# Patient Record
Sex: MALE | Race: ASIAN | Employment: FULL TIME | URBAN - METROPOLITAN AREA
[De-identification: names, ages, dates, MRNs, and addresses within clinical notes are randomized per-mention and may not be internally consistent; named-entity substitution may affect disease eponyms.]

---

## 2017-05-06 ENCOUNTER — OFFICE VISIT (OUTPATIENT)
Dept: FAMILY MEDICINE CLINIC | Age: 37
End: 2017-05-06

## 2017-05-06 VITALS
HEART RATE: 63 BPM | RESPIRATION RATE: 14 BRPM | WEIGHT: 214.8 LBS | OXYGEN SATURATION: 97 % | HEIGHT: 68 IN | TEMPERATURE: 97 F | DIASTOLIC BLOOD PRESSURE: 81 MMHG | SYSTOLIC BLOOD PRESSURE: 118 MMHG | BODY MASS INDEX: 32.55 KG/M2

## 2017-05-06 DIAGNOSIS — R73.02 GLUCOSE INTOLERANCE (IMPAIRED GLUCOSE TOLERANCE): ICD-10-CM

## 2017-05-06 DIAGNOSIS — K11.8 SALIVARY GLAND OBSTRUCTION: ICD-10-CM

## 2017-05-06 DIAGNOSIS — R22.0 JAW SWELLING: Primary | ICD-10-CM

## 2017-05-06 DIAGNOSIS — R20.2 TINGLING IN EXTREMITIES: ICD-10-CM

## 2017-05-06 NOTE — PROGRESS NOTES
Chief Complaint   Patient presents with    Jaw Swelling     left side of jaw swelling x 1 days c/o pain when opening mouth 3/10    Tingling     c/o tingling and burning sensation in both feet x 1 week     Pt states that he is fasting  Med list reviewed  \"REVIEWED RECORD IN PREPARATION FOR VISIT AND HAVE OBTAINED THE NECESSARY DOCUMENTATION\"

## 2017-05-06 NOTE — MR AVS SNAPSHOT
Visit Information Date & Time Provider Department Dept. Phone Encounter #  
 5/6/2017 11:00 AM Tavares Lopez, Shelly [facility] [address] [phone] [id number] Upcoming Health Maintenance Date Due INFLUENZA AGE 9 TO ADULT 8/1/2017 DTaP/Tdap/Td series (2 - Td) 10/17/2023 Allergies as of 5/6/2017  Review Complete On: 5/6/2017 By: Tavares Lopez NP No Known Allergies Current Immunizations  Never Reviewed Name Date Influenza Vaccine PF 10/17/2013  7:57 PM  
 Tdap 10/17/2013  7:54 PM  
  
 Not reviewed this visit You Were Diagnosed With   
  
 Codes Comments Glucose intolerance (impaired glucose tolerance)    -  Primary ICD-10-CM: R73.02 
ICD-9-CM: 790.22 Jaw swelling     ICD-10-CM: R22.0 ICD-9-CM: 784.2 Salivary gland obstruction     ICD-10-CM: K11.8 ICD-9-CM: 527.8 Vitals BP Pulse Temp Resp Height(growth percentile) Weight(growth percentile) 118/81 (BP 1 Location: Right arm, BP Patient Position: Sitting) 63 97 °F (36.1 °C) (Oral) 14 5' 8\" (1.727 m) 214 lb 12.8 oz (97.4 kg) SpO2 BMI Smoking Status 97% 32.66 kg/m2 Never Smoker Vitals History BMI and BSA Data Body Mass Index Body Surface Area  
 32.66 kg/m 2 2.16 m 2 Preferred Pharmacy Pharmacy Name Phone [facility][id number] juan [address] [phone] Your Updated Medication List  
  
   
This list is accurate as of: 5/6/17 11:43 AM.  Always use your most recent med list.  
  
  
  
  
 diclofenac EC 75 mg EC tablet Commonly known as:  VOLTAREN Take 1 Tab by mouth two (2) times a day. ergocalciferol 50,000 unit capsule Commonly known as:  ERGOCALCIFEROL  
TAKE 1 CAP BY MOUTH EVERY SEVEN (7) DAYS  
  
 methylPREDNISolone 4 mg tablet Commonly known as:  Burns Nightingale Take 1 Tab by mouth Specific Days and Specific Times. Per dose jared instructions We Performed the Following HEMOGLOBIN A1C WITH EAG [41369 CPT(R)] LIPID PANEL [59442 CPT(R)] METABOLIC PANEL, COMPREHENSIVE [27366 CPT(R)] Introducing [facility] [place] & [facility]! Dear Jayson Duane: Thank you for requesting a [email or web address] account. Our records indicate that you already have an active [email or web address] account. You can access your account anytime at [email or web address] Did you know that you can access your hospital and ER discharge instructions at any time in [email or web address]? You can also review all of your test results from your hospital stay or ER visit. Additional Information If you have questions, please visit the Frequently Asked Questions section of the [email or web address] website at [email or web address] Remember, [email or web address] is NOT to be used for urgent needs. For medical emergencies, dial 911. Now available from your iPhone and Android! Please provide this summary of care documentation to your next provider. Your primary care clinician is listed as Portia Alba. If you have any questions after today's visit, please call [phone].

## 2017-05-06 NOTE — PROGRESS NOTES
HISTORY OF PRESENT ILLNESS  Nellie Gan is a 39 y.o. male. HPI: Patient reports he woke up this morning with swelling of left jaw. Denies ear infection, jaw pain, nasal congestion or toothache . He also C/O tinging in both feet, he is prediabetic and is wondering if his blood sugar has gone up, requesting lab work,. Past Surgical History:   Procedure Laterality Date    HX HEENT  2013,    gum surgery     No Known Allergies    Current Outpatient Prescriptions:     ergocalciferol (ERGOCALCIFEROL) 50,000 unit capsule, TAKE 1 CAP BY MOUTH EVERY SEVEN (7) DAYS, Disp: 12 Cap, Rfl: 1    diclofenac EC (VOLTAREN) 75 mg EC tablet, Take 1 Tab by mouth two (2) times a day., Disp: 60 Tab, Rfl: 0    methylPREDNISolone (MEDROL DOSEPACK) 4 mg tablet, Take 1 Tab by mouth Specific Days and Specific Times. Per dose jared instructions, Disp: 21 Tab, Rfl: 0    Review of Systems   Constitutional: Negative. Respiratory: Negative. Cardiovascular: Negative. Gastrointestinal: Negative. Blood pressure 118/81, pulse 63, temperature 97 °F (36.1 °C), temperature source Oral, resp. rate 14, height 5' 8\" (1.727 m), weight 214 lb 12.8 oz (97.4 kg), SpO2 97 %. Physical Exam   HENT:   Right Ear: External ear normal.   Left Ear: External ear normal.   Nose: Nose normal.   Mouth/Throat: Oropharynx is clear and moist.   Slightly swelling of left jaw, no pain, or redness or heat   Neck: Neck supple. Cardiovascular: Normal rate and regular rhythm. No murmur heard. Pulmonary/Chest: Effort normal and breath sounds normal.   Abdominal: Soft. Bowel sounds are normal.   Nursing note and vitals reviewed. ASSESSMENT and PLAN    ICD-10-CM ICD-9-CM    1. Jaw swelling R22.0 784.2    2. Glucose intolerance (impaired glucose tolerance) R73.02 790.22 HEMOGLOBIN A1C WITH EAG      METABOLIC PANEL, COMPREHENSIVE      LIPID PANEL   3. 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  Tingling in extremities R20.2 782.0    Advised to apply warm compress to your jaw,and massage jaw   Gargle with lemon water  Call if not better  Pt was given an after visit summary which includes diagnosis, current medicines and vital and voiced understanding of treatment plan

## 2017-05-08 LAB
ALBUMIN SERPL-MCNC: 4.3 G/DL (ref 3.5–5.5)
ALBUMIN/GLOB SERPL: 1.3 {RATIO} (ref 1.2–2.2)
ALP SERPL-CCNC: 75 IU/L (ref 39–117)
ALT SERPL-CCNC: 12 IU/L (ref 0–44)
AST SERPL-CCNC: 14 IU/L (ref 0–40)
BILIRUB SERPL-MCNC: 0.4 MG/DL (ref 0–1.2)
BUN SERPL-MCNC: 12 MG/DL (ref 6–20)
BUN/CREAT SERPL: 12 (ref 9–20)
CALCIUM SERPL-MCNC: 9.7 MG/DL (ref 8.7–10.2)
CHLORIDE SERPL-SCNC: 101 MMOL/L (ref 96–106)
CHOLEST SERPL-MCNC: 143 MG/DL (ref 100–199)
CO2 SERPL-SCNC: 22 MMOL/L (ref 18–29)
CREAT SERPL-MCNC: 1.03 MG/DL (ref 0.76–1.27)
EST. AVERAGE GLUCOSE BLD GHB EST-MCNC: 120 MG/DL
GLOBULIN SER CALC-MCNC: 3.2 G/DL (ref 1.5–4.5)
GLUCOSE SERPL-MCNC: 80 MG/DL (ref 65–99)
HBA1C MFR BLD: 5.8 % (ref 4.8–5.6)
HDLC SERPL-MCNC: 44 MG/DL
INTERPRETATION, 910389: NORMAL
LDLC SERPL CALC-MCNC: 82 MG/DL (ref 0–99)
POTASSIUM SERPL-SCNC: 5.3 MMOL/L (ref 3.5–5.2)
PROT SERPL-MCNC: 7.5 G/DL (ref 6–8.5)
SODIUM SERPL-SCNC: 141 MMOL/L (ref 134–144)
TRIGL SERPL-MCNC: 86 MG/DL (ref 0–149)
VLDLC SERPL CALC-MCNC: 17 MG/DL (ref 5–40)

## 2017-05-17 ENCOUNTER — OFFICE VISIT (OUTPATIENT)
Dept: FAMILY MEDICINE CLINIC | Age: 37
End: 2017-05-17

## 2017-05-17 VITALS
HEART RATE: 66 BPM | TEMPERATURE: 98.1 F | SYSTOLIC BLOOD PRESSURE: 113 MMHG | RESPIRATION RATE: 14 BRPM | WEIGHT: 215.6 LBS | HEIGHT: 68 IN | DIASTOLIC BLOOD PRESSURE: 81 MMHG | BODY MASS INDEX: 32.67 KG/M2 | OXYGEN SATURATION: 95 %

## 2017-05-17 DIAGNOSIS — M79.2 PERIPHERAL NEUROPATHIC PAIN: Primary | ICD-10-CM

## 2017-05-17 RX ORDER — GABAPENTIN 100 MG/1
100 CAPSULE ORAL
Qty: 30 CAP | Refills: 1 | Status: SHIPPED | OUTPATIENT
Start: 2017-05-17

## 2017-05-17 RX ORDER — CYANOCOBALAMIN 1000 UG/ML
1000 INJECTION, SOLUTION INTRAMUSCULAR; SUBCUTANEOUS ONCE
Qty: 1 ML | Refills: 0
Start: 2017-05-17 | End: 2017-05-17

## 2017-05-17 NOTE — MR AVS SNAPSHOT
Visit Information Date & Time Provider Department Dept. Phone Encounter #  
 5/17/2017  7:15 PM Chidi Herbert  [facility] [phone] [id number] Follow-up Instructions Return in about 2 weeks (around 5/31/2017) for nerve pain. Your Appointments 5/22/2017 10:30 AM  
PHYSICAL PRE OP with Allie Barron MD  
[facility] Internal Medicine [address]) Appt Note: Np leaving [facility] practice because his dr left [facility] [address]  
[address] [place] [address] [address] [place] [address] Upcoming Health Maintenance Date Due INFLUENZA AGE 9 TO ADULT 8/1/2017 DTaP/Tdap/Td series (2 - Td) 10/17/2023 Allergies as of 5/17/2017  Review Complete On: 5/6/2017 By: Patito Shipman NP No Known Allergies Current Immunizations  Never Reviewed Name Date Influenza Vaccine PF 10/17/2013  7:57 PM  
 Tdap 10/17/2013  7:54 PM  
  
 Not reviewed this visit You Were Diagnosed With   
  
 Codes Comments Peripheral neuropathic pain    -  Primary ICD-10-CM: G62.9 ICD-9-CM: 356.9 Vitals BP Pulse Temp Resp Height(growth percentile) Weight(growth percentile) 113/81 (BP 1 Location: Left arm, BP Patient Position: Sitting) 66 98.1 °F (36.7 °C) (Oral) 14 5' 8\" (1.727 m) 215 lb 9.6 oz (97.8 kg) SpO2 BMI Smoking Status 95% 32.78 kg/m2 Never Smoker Vitals History BMI and BSA Data Body Mass Index Body Surface Area 32.78 kg/m 2 2.17 m 2 Preferred Pharmacy Pharmacy Name Phone [id number] [place] , [address] [phone] Your Updated Medication List  
  
   
This list is accurate as of: 5/17/17  8:03 PM.  Always use your most recent med list.  
  
  
  
  
 cyanocobalamin 1,000 mcg/mL injection Commonly known as:  VITAMIN B-12  
 1 mL by IntraMUSCular route once for 1 dose. diclofenac EC 75 mg EC tablet Commonly known as:  VOLTAREN Take 1 Tab by mouth two (2) times a day. ergocalciferol 50,000 unit capsule Commonly known as:  ERGOCALCIFEROL  
TAKE 1 CAP BY MOUTH EVERY SEVEN (7) DAYS  
  
 gabapentin 100 mg capsule Commonly known as:  NEURONTIN Take 1 Cap by mouth nightly. methylPREDNISolone 4 mg tablet Commonly known as:  Kalin Hands Take 1 Tab by mouth Specific Days and Specific Times. Per dose jared instructions Prescriptions Sent to Pharmacy Refills  
 gabapentin (NEURONTIN) 100 mg capsule 1 Sig: Take 1 Cap by mouth nightly. Class: Normal  
 [facility], [address] [place] [address] #: [phone] Route: Oral  
  
We Performed the Following THER/PROPH/DIAG INJECTION, SUBCUT/IM [id number] CPT(R)] VITAMIN B12 INJECTION [ [facility]] Follow-up Instructions Return in about 2 weeks (around 5/31/2017) for nerve pain. Patient Instructions Pernicious Anemia: Care Instructions Your Care Instructions Pernicious anemia means that you do not have enough red blood cells. It happens when your body can't absorb vitamin B12 from food. Your body needs vitamin B12 to make red blood cells. Red blood cells carry oxygen around your body. Vitamin B12 also helps your nerves work well. Your doctor can treat this problem with vitamin B12 shots. You may also take vitamin B12 by pill or nasal spray. With treatment, most anemia gets better in a few days. But if you have severe anemia, you may need a blood transfusion to give you red blood cells as quickly as possible. Follow-up care is a key part of your treatment and safety. Be sure to make and go to all appointments, and call your doctor if you are having problems. It's also a good idea to know your test results and keep a list of the medicines you take. How can you care for yourself at home? · Be safe with medicines. Take your medicines exactly as prescribed. Call your doctor if you think you are having a problem with your medicine. · Follow your doctor's instructions about vitamin B12 shots, vitamin B12 pills, or a vitamin B12 nasal spray. · Eat a varied diet. Include foods with a lot of vitamin B12, such as eggs, milk, and meat. · Do not drink alcohol while you are being treated. Alcohol can prevent the body from absorbing vitamin B12. · Eat foods that have folate (also called folic acid). This is another type of B vitamin. Foods with folate include leafy green vegetables, citrus fruits, and fortified cereals. When should you call for help? Call 911 anytime you think you may need emergency care. For example, call if: 
· You passed out (lost consciousness). · You vomit blood or what looks like coffee grounds. · You pass maroon or very bloody stools. · You have severe pain in your belly. Call your doctor now or seek immediate medical care if: 
· You have new belly pain. · You are dizzy or lightheaded, or you feel like you may faint. · Your stools are black and look like tar, or they have streaks of blood. · You have numbness or tingling in your hands or feet. · You can't think clearly. Watch closely for changes in your health, and be sure to contact your doctor if: 
· You have trouble with balance and coordination. · Your fatigue and weakness continue or get worse. · You have any new symptoms, such as vomiting. Where can you learn more? Go to http://cecilia-prasanna.info/. Enter R544 in the search box to learn more about \"Pernicious Anemia: Care Instructions. \" Current as of: October 13, 2016 Content Version: 11.2 © 9371-0606 [employer]. Care instructions adapted under license by [employer] (which disclaims liability or warranty for this information).  If you have questions about a medical condition or this instruction, always ask your healthcare professional. [address] any warranty or liability for your use of this information. Introducing [facility] & HEALTH SERVICES! Dear Edin Lucio: Thank you for requesting a WebXiom account. Our records indicate that you already have an active WebXiom account. You can access your account anytime at [email or web address] Did you know that you can access your hospital and ER discharge instructions at any time in WebXiom? You can also review all of your test results from your hospital stay or ER visit. Additional Information If you have questions, please visit the Frequently Asked Questions section of the WebXiom website at [email or web address] Remember, WebXiom is NOT to be used for urgent needs. For medical emergencies, dial 911. Now available from your iPhone and Android! Please provide this summary of care documentation to your next provider. Your primary care clinician is listed as Karla Leblanc. If you have any questions after today's visit, please call [phone].

## 2017-05-17 NOTE — PROGRESS NOTES
Chief Complaint   Patient presents with    Tingling     c/o tingling and pain in both feet that is getting worse 4/10     Leg Pain     pain in both calf x 2 week      Pt also c/o sweating in the palms of hands  Med list reviewed  \"REVIEWED RECORD IN PREPARATION FOR VISIT AND HAVE OBTAINED THE NECESSARY DOCUMENTATION\"

## 2017-05-17 NOTE — PROGRESS NOTES
Katie Mederos is a 39 y.o. male who was seen in clinic today (5/18/2017). Subjective:  Neuropathic Pain:  Patient has neuropathy, primarily affecting the hands and feet. Symptoms onset: 2 weeks ago. Reports increasing pain in feet described as burning and tingling. Symptoms worse at night. Recent diabetes testing negative. B12 in 8/2016 was low. No current medications for pain. Reports some associated fatigue. Prior to Admission medications    Medication Sig Start Date End Date Taking? Authorizing Provider   cyanocobalamin (VITAMIN B-12) 1,000 mcg/mL injection 1 mL by IntraMUSCular route once for 1 dose. 5/17/17 5/17/17 Yes Christi Mayberry NP   gabapentin (NEURONTIN) 100 mg capsule Take 1 Cap by mouth nightly. 5/17/17  Yes Christi Mayberry NP   ergocalciferol (ERGOCALCIFEROL) 50,000 unit capsule TAKE 1 CAP BY MOUTH EVERY SEVEN (7) DAYS 11/28/16  Yes Christi Mayberry NP   diclofenac EC (VOLTAREN) 75 mg EC tablet Take 1 Tab by mouth two (2) times a day. 8/16/16   Bill Domínguez MD   methylPREDNISolone (MEDROL DOSEPACK) 4 mg tablet Take 1 Tab by mouth Specific Days and Specific Times. Per dose jared instructions 8/16/16   Bill Domínguez MD          No Known Allergies        ROS  See HPI    Objective:   Physical Exam   Constitutional: He is oriented to person, place, and time. He appears well-developed and well-nourished. Neck: Normal range of motion. Neck supple. No JVD present. Carotid bruit is not present. No thyromegaly present. Cardiovascular: Normal rate, regular rhythm and intact distal pulses. Exam reveals no gallop and no friction rub. No murmur heard. Pulmonary/Chest: Effort normal and breath sounds normal. No respiratory distress. Musculoskeletal: He exhibits no edema. Right hand: Normal sensation noted. Normal strength noted. Left hand: Normal sensation noted. Normal strength noted. Lymphadenopathy:     He has no cervical adenopathy.    Neurological: He is alert and oriented to person, place, and time. He has normal strength. No cranial nerve deficit or sensory deficit. Gait normal.   Psychiatric: He has a normal mood and affect. His behavior is normal.   Nursing note and vitals reviewed. Visit Vitals    /81 (BP 1 Location: Left arm, BP Patient Position: Sitting)    Pulse 66    Temp 98.1 °F (36.7 °C) (Oral)    Resp 14    Ht 5' 8\" (1.727 m)    Wt 215 lb 9.6 oz (97.8 kg)    SpO2 95%    BMI 32.78 kg/m2       Assessment & Plan:  Eleanor Maya was seen today for tingling and leg pain. Diagnoses and all orders for this visit:    Peripheral neuropathic pain  Possible B12 deficiency given previous lab results. Will given B12 injection today and re-evaluate effectiveness. Additional lab testing and neurology consult as needed. Gabapentin QHS for pain. -     VITAMIN B12 INJECTION ()  -     cyanocobalamin (VITAMIN B-12) 1,000 mcg/mL injection; 1 mL by IntraMUSCular route once for 1 dose.  -     gabapentin (NEURONTIN) 100 mg capsule; Take 1 Cap by mouth nightly. I have discussed the diagnosis with the patient and the intended plan as seen in the above orders. The patient has received an after-visit summary along with patient information handout. I have discussed medication side effects and warnings with the patient as well. Follow-up Disposition:  Return in about 2 weeks (around 5/31/2017) for nerve pain.         Ana Aguiar NP

## 2017-05-18 NOTE — PATIENT INSTRUCTIONS
Pernicious Anemia: Care Instructions  Your Care Instructions    Pernicious anemia means that you do not have enough red blood cells. It happens when your body can't absorb vitamin B12 from food. Your body needs vitamin B12 to make red blood cells. Red blood cells carry oxygen around your body. Vitamin B12 also helps your nerves work well. Your doctor can treat this problem with vitamin B12 shots. You may also take vitamin B12 by pill or nasal spray. With treatment, most anemia gets better in a few days. But if you have severe anemia, you may need a blood transfusion to give you red blood cells as quickly as possible. Follow-up care is a key part of your treatment and safety. Be sure to make and go to all appointments, and call your doctor if you are having problems. It's also a good idea to know your test results and keep a list of the medicines you take. How can you care for yourself at home? · Be safe with medicines. Take your medicines exactly as prescribed. Call your doctor if you think you are having a problem with your medicine. · Follow your doctor's instructions about vitamin B12 shots, vitamin B12 pills, or a vitamin B12 nasal spray. · Eat a varied diet. Include foods with a lot of vitamin B12, such as eggs, milk, and meat. · Do not drink alcohol while you are being treated. Alcohol can prevent the body from absorbing vitamin B12. · Eat foods that have folate (also called folic acid). This is another type of B vitamin. Foods with folate include leafy green vegetables, citrus fruits, and fortified cereals. When should you call for help? Call 911 anytime you think you may need emergency care. For example, call if:  · You passed out (lost consciousness). · You vomit blood or what looks like coffee grounds. · You pass maroon or very bloody stools. · You have severe pain in your belly. Call your doctor now or seek immediate medical care if:  · You have new belly pain.   · You are dizzy or lightheaded, or you feel like you may faint. · Your stools are black and look like tar, or they have streaks of blood. · You have numbness or tingling in your hands or feet. · You can't think clearly. Watch closely for changes in your health, and be sure to contact your doctor if:  · You have trouble with balance and coordination. · Your fatigue and weakness continue or get worse. · You have any new symptoms, such as vomiting. Where can you learn more? Go to http://cecilia-prasanna.info/. Enter V529 in the search box to learn more about \"Pernicious Anemia: Care Instructions. \"  Current as of: October 13, 2016  Content Version: 11.2  © 7532-1926 [employer]. Care instructions adapted under license by [employer] (which disclaims liability or warranty for this information). If you have questions about a medical condition or this instruction, always ask your healthcare professional. [place] [address] any warranty or liability for your use of this information.

## 2017-05-22 ENCOUNTER — OFFICE VISIT (OUTPATIENT)
Dept: INTERNAL MEDICINE CLINIC | Age: 37
End: 2017-05-22

## 2017-05-22 VITALS
TEMPERATURE: 97.8 F | BODY MASS INDEX: 32.83 KG/M2 | RESPIRATION RATE: 16 BRPM | OXYGEN SATURATION: 98 % | DIASTOLIC BLOOD PRESSURE: 74 MMHG | HEIGHT: 68 IN | HEART RATE: 69 BPM | SYSTOLIC BLOOD PRESSURE: 98 MMHG | WEIGHT: 216.6 LBS

## 2017-05-22 DIAGNOSIS — R73.02 IGT (IMPAIRED GLUCOSE TOLERANCE): ICD-10-CM

## 2017-05-22 DIAGNOSIS — R73.03 PRE-DIABETES: ICD-10-CM

## 2017-05-22 DIAGNOSIS — E55.9 VITAMIN D DEFICIENCY: ICD-10-CM

## 2017-05-22 DIAGNOSIS — M19.90 ARTHRITIS: ICD-10-CM

## 2017-05-22 DIAGNOSIS — R93.89 ABNORMAL CXR: ICD-10-CM

## 2017-05-22 DIAGNOSIS — E53.8 B12 DEFICIENCY DUE TO DIET: Primary | ICD-10-CM

## 2017-05-22 DIAGNOSIS — R20.2 PARESTHESIA OF BOTH FEET: ICD-10-CM

## 2017-05-22 RX ORDER — LANOLIN ALCOHOL/MO/W.PET/CERES
500 CREAM (GRAM) TOPICAL DAILY
Qty: 90 TAB | Refills: 0 | Status: SHIPPED | OUTPATIENT
Start: 2017-05-22 | End: 2017-08-20

## 2017-05-22 RX ORDER — CYANOCOBALAMIN 1000 UG/ML
1000 INJECTION, SOLUTION INTRAMUSCULAR; SUBCUTANEOUS ONCE
Qty: 1 ML | Refills: 0
Start: 2017-05-22 | End: 2017-05-22

## 2017-05-22 NOTE — PROGRESS NOTES
Chief Complaint   Patient presents with    New Patient     establishing pcp as  left the practice he was attending. states that he does not have any concerns. States that he started taking gabapentin for tingling in hands and feet two weeks ago and it is not resolved.

## 2017-05-22 NOTE — PROGRESS NOTES
Written by Alyssa Appiah, as dictated by Dr. Eda Buchanan MD.    Bernardo Weeks is a 39 y.o. male. HPI  The patient comes in today to establish care. He was previously following with Dr. Hailey Schumacher at [facility], but his physician left the practice. He has been having tingling sensation in his hands and feet and his A1c was 5.8%. He went for a follow up and was given B12 injection. His B12 was low in 08/2016 at 242. He is a vegetarian, and does not take B12 supplements. He is still having the tingling and numbness. He was put on Neurontin by a ROXY Kumar 2 weeks ago. He was previously pre-diabetic and he has brought his A1c to 5.8% . He does 30 minutes of exercise 5 days a week. He cut carbs and increase protein intake. He does not smoke or drink alcohol. He is on Vitamin D 50,000 units. He denies any allergies or irregular BMs. Current Outpatient Prescriptions on File Prior to Visit   Medication Sig Dispense Refill    gabapentin (NEURONTIN) 100 mg capsule Take 1 Cap by mouth nightly. 30 Cap 1    ergocalciferol (ERGOCALCIFEROL) 50,000 unit capsule TAKE 1 CAP BY MOUTH EVERY SEVEN (7) DAYS 12 Cap 1    diclofenac EC (VOLTAREN) 75 mg EC tablet Take 1 Tab by mouth two (2) times a day. 60 Tab 0    methylPREDNISolone (MEDROL DOSEPACK) 4 mg tablet Take 1 Tab by mouth Specific Days and Specific Times. Per dose jared instructions 21 Tab 0     No current facility-administered medications on file prior to visit. Past Surgical History:   Procedure Laterality Date    HX HEENT  2013,    gum surgery       Family History   Problem Relation Age of Onset    Diabetes Father        Social History     Social History    Marital status:      Spouse name: N/A    Number of children: N/A    Years of education: N/A     Occupational History    Not on file.      Social History Main Topics    Smoking status: Never Smoker    Smokeless tobacco: Never Used    Alcohol use No    Drug use: No    Sexual activity: Not on file     Other Topics Concern    Not on file     Social History Narrative       Office Visit on 05/06/2017   Component Date Value Ref Range Status    Hemoglobin A1c 05/06/2017 5.8* 4.8 - 5.6 % Final    Comment:          Pre-diabetes: 5.7 - 6.4           Diabetes: >6.4           Glycemic control for adults with diabetes: <7.0      Estimated average glucose 05/06/2017 120  mg/dL Final    Glucose 05/06/2017 80  65 - 99 mg/dL Final    Comment: Specimen received in contact with cells. No visible hemolysis  present. However GLUC may be decreased and K increased. Clinical  correlation indicated.  BUN 05/06/2017 12  6 - 20 mg/dL Final    Creatinine 05/06/2017 1.03  0.76 - 1.27 mg/dL Final    GFR est non-AA 05/06/2017 92  >59 mL/min/1.73 Final    GFR est AA 05/06/2017 107  >59 mL/min/1.73 Final    BUN/Creatinine ratio 05/06/2017 12  9 - 20 Final    Sodium 05/06/2017 141  134 - 144 mmol/L Final    Potassium 05/06/2017 5.3* 3.5 - 5.2 mmol/L Final    Chloride 05/06/2017 101  96 - 106 mmol/L Final    CO2 05/06/2017 22  18 - 29 mmol/L Final    Calcium 05/06/2017 9.7  8.7 - 10.2 mg/dL Final    Protein, total 05/06/2017 7.5  6.0 - 8.5 g/dL Final    Albumin 05/06/2017 4.3  3.5 - 5.5 g/dL Final    GLOBULIN, TOTAL 05/06/2017 3.2  1.5 - 4.5 g/dL Final    A-G Ratio 05/06/2017 1.3  1.2 - 2.2 Final    Bilirubin, total 05/06/2017 0.4  0.0 - 1.2 mg/dL Final    Alk. phosphatase 05/06/2017 75  39 - 117 IU/L Final    AST (SGOT) 05/06/2017 14  0 - 40 IU/L Final    ALT (SGPT) 05/06/2017 12  0 - 44 IU/L Final    Cholesterol, total 05/06/2017 143  100 - 199 mg/dL Final    Triglyceride 05/06/2017 86  0 - 149 mg/dL Final    HDL Cholesterol 05/06/2017 44  >39 mg/dL Final    VLDL, calculated 05/06/2017 17  5 - 40 mg/dL Final    LDL, calculated 05/06/2017 82  0 - 99 mg/dL Final    INTERPRETATION 05/06/2017 Note   Final    Supplement report is available.        Review of Systems   Constitutional: Negative for malaise/fatigue. HENT: Negative for congestion. Respiratory: Negative for cough and wheezing. Cardiovascular: Negative for chest pain and palpitations. Musculoskeletal: Negative for joint pain and myalgias. Neurological: Positive for tingling. Negative for dizziness, sensory change, weakness and headaches. Visit Vitals    BP 98/74 (BP 1 Location: Right arm, BP Patient Position: Sitting)    Pulse 69    Temp 97.8 °F (36.6 °C) (Oral)    Resp 16    Ht 5' 8\" (1.727 m)    Wt 216 lb 9.6 oz (98.2 kg)    SpO2 98%    BMI 32.93 kg/m2     Physical Exam   Constitutional: He is oriented to person, place, and time. He appears well-nourished. No distress. HENT:   Right Ear: External ear normal.   Left Ear: External ear normal.   Mouth/Throat: Oropharynx is clear and moist.   Eyes: Conjunctivae and EOM are normal. Right eye exhibits no discharge. Left eye exhibits no discharge. Neck: Normal range of motion. Neck supple. Cardiovascular: Normal rate and regular rhythm. Pulmonary/Chest: Effort normal and breath sounds normal. He has no wheezes. Abdominal: Soft. Bowel sounds are normal. He exhibits no distension. Lymphadenopathy:     He has no cervical adenopathy. Neurological: He is alert and oriented to person, place, and time. Psychiatric: He has a normal mood and affect. Nursing note and vitals reviewed. ASSESSMENT and PLAN    ICD-10-CM ICD-9-CM    1. B12 deficiency due to diet E53.8 266.2 cyanocobalamin (VITAMIN B12) 500 mcg tablet sent to pharmacy. I want him to start taking B12 supplements orally and we will give him another B12 injection. I want him to come back the first week of July to have his levels checked. 2. IGT (impaired glucose tolerance) R73.02 790.22 I commended him on his exercise regimen and discussed that he should continue dieting to keep his A1c low.     3. Paresthesia of both feet R20.2 782.0 I discussed that B12 takes some time to build up in his system, but this sxs should stop with B12. I want him to stop the Neurontin. 4. Vitamin D deficiency E55.9 268.9 I want him to stop taking the 50,000 iu and start taking Vitamin D 1000 iu daily. This plan was reviewed with the patient and patient agrees. All questions were answered. This scribe documentation was reviewed by me and accurately reflects the examination and decisions made by me. This note will not be viewable in [address]

## 2017-05-23 RX ORDER — ERGOCALCIFEROL 1.25 MG/1
CAPSULE ORAL
Qty: 12 CAP | Refills: 1 | Status: SHIPPED | OUTPATIENT
Start: 2017-05-23

## 2017-07-25 ENCOUNTER — OFFICE VISIT (OUTPATIENT)
Dept: INTERNAL MEDICINE CLINIC | Age: 37
End: 2017-07-25

## 2017-07-25 VITALS
HEIGHT: 68 IN | DIASTOLIC BLOOD PRESSURE: 72 MMHG | RESPIRATION RATE: 14 BRPM | OXYGEN SATURATION: 98 % | TEMPERATURE: 97.9 F | WEIGHT: 210 LBS | HEART RATE: 64 BPM | SYSTOLIC BLOOD PRESSURE: 104 MMHG | BODY MASS INDEX: 31.83 KG/M2

## 2017-07-25 DIAGNOSIS — E55.9 VITAMIN D DEFICIENCY: ICD-10-CM

## 2017-07-25 DIAGNOSIS — Z00.00 PHYSICAL EXAM: Primary | ICD-10-CM

## 2017-07-25 DIAGNOSIS — R73.02 IGT (IMPAIRED GLUCOSE TOLERANCE): ICD-10-CM

## 2017-07-25 DIAGNOSIS — E53.8 B12 DEFICIENCY: ICD-10-CM

## 2017-07-25 RX ORDER — MELATONIN
DAILY
COMMUNITY

## 2017-07-25 NOTE — MR AVS SNAPSHOT
Visit Information Date & Time Provider Department Dept. Phone Encounter #  
 7/25/2017 10:00 AM Alirio Henry, [address] [facility],[address] Internal Medicine [phone] [id number] Upcoming Health Maintenance Date Due INFLUENZA AGE 9 TO ADULT 8/1/2017 DTaP/Tdap/Td series (2 - Td) 10/17/2023 Allergies as of 7/25/2017  Review Complete On: 7/25/2017 By: Alirio Henry MD  
 No Known Allergies Current Immunizations  Never Reviewed Name Date Influenza Vaccine PF 10/17/2013  7:57 PM  
 Tdap 10/17/2013  7:54 PM  
  
 Not reviewed this visit You Were Diagnosed With   
  
 Codes Comments Physical exam    -  Primary ICD-10-CM: Z00.00 ICD-9-CM: V70.9 B12 deficiency     ICD-10-CM: E53.8 ICD-9-CM: 266.2 IGT (impaired glucose tolerance)     ICD-10-CM: R73.02 
ICD-9-CM: 790.22 Vitamin D deficiency     ICD-10-CM: E55.9 ICD-9-CM: 268.9 Vitals BP Pulse Temp Resp Height(growth percentile) Weight(growth percentile) 104/72 (BP 1 Location: Left arm, BP Patient Position: Sitting) 64 97.9 °F (36.6 °C) (Oral) 14 5' 8\" (1.727 m) 210 lb (95.3 kg) SpO2 BMI Smoking Status 98% 31.93 kg/m2 Never Smoker Vitals History BMI and BSA Data Body Mass Index Body Surface Area  
 31.93 kg/m 2 2.14 m 2 Preferred Pharmacy Pharmacy Name Phone [id number] Vivienne Latham, [address] [place] [address] [phone] Your Updated Medication List  
  
   
This list is accurate as of: 7/25/17 10:31 AM.  Always use your most recent med list.  
  
  
  
  
 cyanocobalamin 500 mcg tablet Commonly known as:  VITAMIN B12 Take 1 Tab by mouth daily for 90 days. diclofenac EC 75 mg EC tablet Commonly known as:  VOLTAREN Take 1 Tab by mouth two (2) times a day. ergocalciferol 50,000 unit capsule Commonly known as:  ERGOCALCIFEROL  
TAKE 1 CAP BY MOUTH EVERY SEVEN (7) DAYS  
  
 gabapentin 100 mg capsule Commonly known as:  NEURONTIN Take 1 Cap by mouth nightly. VITAMIN D3 1,000 unit tablet Generic drug:  cholecalciferol Take  by mouth daily. We Performed the Following HEMOGLOBIN A1C WITH EAG [36932 CPT(R)] VITAMIN B12 [id number] CPT(R)] VITAMIN D, 25 HYDROXY [id number] CPT(R)] Introducing [facility]! Dear Margareth Marshall: Thank you for requesting a [employer] account. Our records indicate that you already have an active [employer] account. You can access your account anytime at [email or web address] Did you know that you can access your hospital and ER discharge instructions at any time in [employer]? You can also review all of your test results from your hospital stay or ER visit. Additional Information If you have questions, please visit the Frequently Asked Questions section of the [employer] website at [email or web address] Remember, [employer] is NOT to be used for urgent needs. For medical emergencies, dial 911. Now available from your iPhone and Android! Please provide this summary of care documentation to your next provider. Your primary care clinician is listed as Bridget Lundy. If you have any questions after today's visit, please call [phone].

## 2017-07-25 NOTE — PROGRESS NOTES
Written by Vance Caldera, as dictated by Dr. Karin Santamaria MD.    Renan Jenkins is a 44 y.o. male. HPI  The patient comes in today for a complete physical examination. He does not take gabapentin. His B12 and D levels were low so he is compliant on oral B12 and D supplements. He still experiences tingling sensations occasionally. He has lost weight from 216 lbs in 05/2017 to 210 lbs today. He denies any allergies, cough, congestion, sore throat, headaches, sleep issues/snoring, heartburn, constipation, urinary issues, or back pain. He does not smoke. Current Outpatient Prescriptions on File Prior to Visit   Medication Sig Dispense Refill    cyanocobalamin (VITAMIN B12) 500 mcg tablet Take 1 Tab by mouth daily for 90 days. 90 Tab 0    ergocalciferol (ERGOCALCIFEROL) 50,000 unit capsule TAKE 1 CAP BY MOUTH EVERY SEVEN (7) DAYS 12 Cap 1    gabapentin (NEURONTIN) 100 mg capsule Take 1 Cap by mouth nightly. 30 Cap 1    diclofenac EC (VOLTAREN) 75 mg EC tablet Take 1 Tab by mouth two (2) times a day. 60 Tab 0     No current facility-administered medications on file prior to visit. Past Surgical History:   Procedure Laterality Date    HX HEENT  2013,    gum surgery       Family History   Problem Relation Age of Onset    Diabetes Father        Social History     Social History    Marital status:      Spouse name: N/A    Number of children: N/A    Years of education: N/A     Occupational History    Not on file.      Social History Main Topics    Smoking status: Never Smoker    Smokeless tobacco: Never Used    Alcohol use No    Drug use: No    Sexual activity: Yes     Partners: Female     Other Topics Concern    Not on file     Social History Narrative       Office Visit on 05/06/2017   Component Date Value Ref Range Status    Hemoglobin A1c 05/06/2017 5.8* 4.8 - 5.6 % Final    Comment:          Pre-diabetes: 5.7 - 6.4           Diabetes: >6.4 Glycemic control for adults with diabetes: <7.0      Estimated average glucose 05/06/2017 120  mg/dL Final    Glucose 05/06/2017 80  65 - 99 mg/dL Final    Comment: Specimen received in contact with cells. No visible hemolysis  present. However GLUC may be decreased and K increased. Clinical  correlation indicated.  BUN 05/06/2017 12  6 - 20 mg/dL Final    Creatinine 05/06/2017 1.03  0.76 - 1.27 mg/dL Final    GFR est non-AA 05/06/2017 92  >59 mL/min/1.73 Final    GFR est AA 05/06/2017 107  >59 mL/min/1.73 Final    BUN/Creatinine ratio 05/06/2017 12  9 - 20 Final    Sodium 05/06/2017 141  134 - 144 mmol/L Final    Potassium 05/06/2017 5.3* 3.5 - 5.2 mmol/L Final    Chloride 05/06/2017 101  96 - 106 mmol/L Final    CO2 05/06/2017 22  18 - 29 mmol/L Final    Calcium 05/06/2017 9.7  8.7 - 10.2 mg/dL Final    Protein, total 05/06/2017 7.5  6.0 - 8.5 g/dL Final    Albumin 05/06/2017 4.3  3.5 - 5.5 g/dL Final    GLOBULIN, TOTAL 05/06/2017 3.2  1.5 - 4.5 g/dL Final    A-G Ratio 05/06/2017 1.3  1.2 - 2.2 Final    Bilirubin, total 05/06/2017 0.4  0.0 - 1.2 mg/dL Final    Alk. phosphatase 05/06/2017 75  39 - 117 IU/L Final    AST (SGOT) 05/06/2017 14  0 - 40 IU/L Final    ALT (SGPT) 05/06/2017 12  0 - 44 IU/L Final    Cholesterol, total 05/06/2017 143  100 - 199 mg/dL Final    Triglyceride 05/06/2017 86  0 - 149 mg/dL Final    HDL Cholesterol 05/06/2017 44  >39 mg/dL Final    VLDL, calculated 05/06/2017 17  5 - 40 mg/dL Final    LDL, calculated 05/06/2017 82  0 - 99 mg/dL Final    INTERPRETATION 05/06/2017 Note   Final    Supplement report is available. Review of Systems   Constitutional: Negative for malaise/fatigue. HENT: Negative for congestion. Eyes: Negative for blurred vision and pain. Respiratory: Negative for cough and shortness of breath. Cardiovascular: Negative for chest pain and palpitations. Gastrointestinal: Negative for abdominal pain and heartburn. Genitourinary: Negative for frequency and urgency. Musculoskeletal: Negative for joint pain and myalgias. Neurological: Negative for dizziness, tingling, sensory change, weakness and headaches. Psychiatric/Behavioral: Negative for depression, memory loss and substance abuse. Visit Vitals    /72 (BP 1 Location: Left arm, BP Patient Position: Sitting)    Pulse 64    Temp 97.9 °F (36.6 °C) (Oral)    Resp 14    Ht 5' 8\" (1.727 m)    Wt 210 lb (95.3 kg)    SpO2 98%    BMI 31.93 kg/m2       Physical Exam   Constitutional: He is oriented to person, place, and time. He appears well-developed. No distress. Obese   HENT:   Right Ear: External ear normal.   Left Ear: External ear normal.   Eyes: Conjunctivae and EOM are normal. Right eye exhibits no discharge. Left eye exhibits no discharge. Neck: Normal range of motion. Neck supple. Cardiovascular: Normal rate and regular rhythm. Pulmonary/Chest: Effort normal and breath sounds normal. He has no wheezes. Abdominal: Soft. Bowel sounds are normal. There is no tenderness. Lymphadenopathy:     He has no cervical adenopathy. Neurological: He is alert and oriented to person, place, and time. Reflex Scores:       Patellar reflexes are 2+ on the right side and 2+ on the left side. Skin: He is not diaphoretic. Psychiatric: He has a normal mood and affect. His behavior is normal.   Nursing note and vitals reviewed. ASSESSMENT and PLAN    ICD-10-CM ICD-9-CM    1. Physical exam Z00.00 V70.9 Complete physical exam done. Basic labs drawn. 2. B12 deficiency E53.8 266.2 VITAMIN B12    Will recheck levels today. 3. IGT (impaired glucose tolerance) R73.02 790.22 HEMOGLOBIN A1C WITH EAG    I discussed this will likely be lower as he has lost weight. 4. Vitamin D deficiency E55.9 268.9 VITAMIN D, 25 HYDROXY    Will recheck levels today. This plan was reviewed with the patient and patient agrees. All questions were answered.     This scribe documentation was reviewed by me and accurately reflects the examination and decisions made by me.

## 2017-07-25 NOTE — PROGRESS NOTES
Patient's identity verified with two patient identifiers (name and date of birth). 1. Have you been to the ER, urgent care clinic since your last visit? Hospitalized since your last visit? No    2. Have you seen or consulted any other health care providers outside of the [address] [place] [address] since your last visit? Include any pap smears or colon screening. No    Chief Complaint   Patient presents with    Complete Physical     Fasting labs. Fasting.

## 2017-07-26 LAB
25(OH)D3+25(OH)D2 SERPL-MCNC: 43.1 NG/ML (ref 30–100)
EST. AVERAGE GLUCOSE BLD GHB EST-MCNC: 108 MG/DL
HBA1C MFR BLD: 5.4 % (ref 4.8–5.6)
VIT B12 SERPL-MCNC: 505 PG/ML (ref 211–946)

## 2017-07-27 NOTE — PROGRESS NOTES
Salvador Burton, your B12, vitamin D  & diabetes numbers have improved. Continue same dose B12 & exercise.

## 2022-05-03 ENCOUNTER — OFFICE VISIT (OUTPATIENT)
Dept: ORTHOPEDIC SURGERY | Age: 42
End: 2022-05-03
Payer: COMMERCIAL

## 2022-05-03 VITALS — WEIGHT: 232 LBS | BODY MASS INDEX: 35.28 KG/M2

## 2022-05-03 DIAGNOSIS — G89.29 CHRONIC PAIN OF RIGHT KNEE: ICD-10-CM

## 2022-05-03 DIAGNOSIS — M25.561 KNEE MENISCUS PAIN, RIGHT: ICD-10-CM

## 2022-05-03 DIAGNOSIS — M25.561 CHRONIC PAIN OF RIGHT KNEE: ICD-10-CM

## 2022-05-03 DIAGNOSIS — M71.21 BAKER'S CYST OF KNEE, RIGHT: ICD-10-CM

## 2022-05-03 DIAGNOSIS — S83.241A ACUTE MEDIAL MENISCUS TEAR OF RIGHT KNEE, INITIAL ENCOUNTER: Primary | ICD-10-CM

## 2022-05-03 PROCEDURE — 99203 OFFICE O/P NEW LOW 30 MIN: CPT | Performed by: ORTHOPAEDIC SURGERY

## 2022-05-03 NOTE — PROGRESS NOTES
Terrance Elena (: 1980) is a 42 y.o. male, patient, here for evaluation of the following chief complaint(s):  Knee Pain (right)       HPI:    He began having increased right knee pain in early April. The patient states that his pain came on gradually and reports no specific injury. He describes his right knee pain as moderate and dull. His right knee pain does not wake him up from sleep at night. He has been experiencing some swelling in his right knee. Since his initial onset of discomfort, the patient states that his pain levels unchanged. He reports that walking makes pain worse and rest makes pain better. The patient reports taking no medication for his discomfort. He was not seen in the emergency room for his right knee pain and reports no previous or related right knee surgery. No Known Allergies    Current Outpatient Medications   Medication Sig    cholecalciferol (VITAMIN D3) 1,000 unit tablet Take  by mouth daily.  ergocalciferol (ERGOCALCIFEROL) 50,000 unit capsule TAKE 1 CAP BY MOUTH EVERY SEVEN (7) DAYS    gabapentin (NEURONTIN) 100 mg capsule Take 1 Cap by mouth nightly.  diclofenac EC (VOLTAREN) 75 mg EC tablet Take 1 Tab by mouth two (2) times a day. No current facility-administered medications for this visit. History reviewed. No pertinent past medical history.      Past Surgical History:   Procedure Laterality Date    HX 2013,    gum surgery       Family History   Problem Relation Age of Onset    Diabetes Father         Social History     Socioeconomic History    Marital status:      Spouse name: Not on file    Number of children: Not on file    Years of education: Not on file    Highest education level: Not on file   Occupational History    Not on file   Tobacco Use    Smoking status: Never Smoker    Smokeless tobacco: Never Used   Substance and Sexual Activity    Alcohol use: No    Drug use: No    Sexual activity: Yes     Partners: Female   Other Topics Concern    Not on file   Social History Narrative    Not on file     Social Determinants of Health     Financial Resource Strain:     Difficulty of Paying Living Expenses: Not on file   Food Insecurity:     Worried About Running Out of Food in the Last Year: Not on file    Buck of Food in the Last Year: Not on file   Transportation Needs:     Lack of Transportation (Medical): Not on file    Lack of Transportation (Non-Medical): Not on file   Physical Activity:     Days of Exercise per Week: Not on file    Minutes of Exercise per Session: Not on file   Stress:     Feeling of Stress : Not on file   Social Connections:     Frequency of Communication with Friends and Family: Not on file    Frequency of Social Gatherings with Friends and Family: Not on file    Attends Yazidi Services: Not on file    Active Member of [address] [place] [address] or Organizations: Not on file    Attends Club or Organization Meetings: Not on file    Marital Status: Not on file   Intimate Partner Violence:     Fear of Current or Ex-Partner: Not on file    Emotionally Abused: Not on file    Physically Abused: Not on file    Sexually Abused: Not on file   Housing Stability:     Unable to Pay for Housing in the Last Year: Not on file    Number of [place] in the Last Year: Not on file    Unstable Housing in the Last Year: Not on file       Review of Systems   All other systems reviewed and are negative. Vitals: Wt 232 lb (105.2 kg)   BMI 35.28 kg/m²    Body mass index is 35.28 kg/m². Ortho Exam     The patient is well-developed and well-nourished. The patient presents today in alert and oriented x3 with a normal mood and affect. The patient stands with a normal weightbearing line but walks with a slightly antalgic gait because of his right knee pain. Right knee, the patient is tender to palpation along the medial joint line, and has an effusion.  The patient has discomfort with Pricila´s maneuvers, and the knee is stable. They lack full flexion secondary to the effusion, but have full extension. There is a palpable Baker's cyst.  They have 5/5 strength, and are neurovascularly intact distally. There is no erythema, warmth or skin lesions present. ASSESSMENT/PLAN:      1. Acute medial meniscus tear of right knee, initial encounter  -     MRI KNEE RT WO CONT; Future  2. Chronic pain of right knee  -     XR KNEE RT MIN 4 V; Future  3. Baker's cyst of knee, right  4. Knee meniscus pain, right     XR Results (most recent):  Results from Appointment encounter on 05/03/22    XR KNEE RT MIN 4 V    Narrative  Right knee 4 view x-ray show no evidence of a fracture or dislocation. Joint spaces are well-maintained. Below is the assessment and plan developed based on review of pertinent history, physical exam, labs, studies, and medications. We discussed the patient's ongoing right knee pain and his signs, symptoms, physical exam, description of his pain, and x-rays are consistent with a medial meniscus tear and palpable Baker's cyst.  The possible treatment options were discussed with the patient and because of the over 1 month long duration of his increased pain, no improvement with multiple modalities of conservative management including an at-home exercise program, his physical exam, description of his pain, x-rays, and his inability to complete daily living activities without significant discomfort we elected to obtain an MRI of his right knee to further evaluate the severity of his medial meniscus tear and determine the size of his palpable Baker's cyst.  The MRI images and results will be used in preoperative planning if and almost certainly when surgical intervention is necessary. The risks and benefits of the MRI were discussed in detail with the patient and he would like to proceed. We will schedule this at his convenience.   I will see him back after his MRI is complete to discuss the images, results, and further treatment options. In the interim, I did encourage him to ice and elevate when possible, modify his activity level based on his right knee pain, and use anti-inflammatory medication when necessary. The patient will also work on range of motion, strengthening, and stretching exercises with an at-home exercise program as pain tolerates. He is to avoid any deep knee bend activities against resistance, squatting, kneeling, stairs, lunging, cutting, twisting, change of direction, and high impact loading activities. I will see him back as noted above after his right knee MRI is complete. **We will obtain an MRI for more information to determine the best treatment plan moving forward and help us prepare for surgical intervention if necessary. **    Return in about 2 weeks (around 5/17/2022) for After his right knee MRI is complete. An electronic signature was used to authenticate this note.   -- Johann Holcomb MD

## 2022-05-24 ENCOUNTER — OFFICE VISIT (OUTPATIENT)
Dept: ORTHOPEDIC SURGERY | Age: 42
End: 2022-05-24
Payer: COMMERCIAL

## 2022-05-24 VITALS — WEIGHT: 230 LBS | HEIGHT: 70 IN | BODY MASS INDEX: 32.93 KG/M2

## 2022-05-24 DIAGNOSIS — M22.41 PATELLOFEMORAL CHONDROSIS OF RIGHT KNEE: Primary | ICD-10-CM

## 2022-05-24 DIAGNOSIS — M25.561 CHRONIC PAIN OF RIGHT KNEE: ICD-10-CM

## 2022-05-24 DIAGNOSIS — S83.001D PATELLAR SUBLUXATION, RIGHT, SUBSEQUENT ENCOUNTER: ICD-10-CM

## 2022-05-24 DIAGNOSIS — G89.29 CHRONIC PAIN OF RIGHT KNEE: ICD-10-CM

## 2022-05-24 DIAGNOSIS — M71.21 BAKER'S CYST OF KNEE, RIGHT: ICD-10-CM

## 2022-05-24 DIAGNOSIS — M22.8X1 PATELLAR MALTRACKING, RIGHT: ICD-10-CM

## 2022-05-24 PROCEDURE — 99214 OFFICE O/P EST MOD 30 MIN: CPT | Performed by: ORTHOPAEDIC SURGERY

## 2022-05-24 NOTE — PROGRESS NOTES
Zaid Ahuja (: 1980) is a 42 y.o. male, patient, here for evaluation of the following chief complaint(s):  Knee Pain (right (MRI results))       HPI:    He was last seen for his right knee pain on 5/3/2022. Since then, the patient did have an MRI performed on his right knee on 5/10/2022. The patient states that his pain is gotten worse since his last visit. He rates the severity of his right knee pain as a 4 out of 10. He describes pain as stabbing and constant. His right knee pain does not wake him up from sleep at night. The patient has been experiencing some swelling in his right knee. He reports taking no medication for his discomfort. Right knee MRI images and results were independently reviewed and they were consistent with a large Baker's cyst with partial cyst rupture. Mild lateral patellar subluxation with increased TTTG distance. Foci of patellofemoral and anterior medial femoral condylar chondral derangement. No meniscal, ligamentous, or tendinous derangement is demonstrated. No Known Allergies    Current Outpatient Medications   Medication Sig    cholecalciferol (VITAMIN D3) 1,000 unit tablet Take  by mouth daily.  ergocalciferol (ERGOCALCIFEROL) 50,000 unit capsule TAKE 1 CAP BY MOUTH EVERY SEVEN (7) DAYS    gabapentin (NEURONTIN) 100 mg capsule Take 1 Cap by mouth nightly.  diclofenac EC (VOLTAREN) 75 mg EC tablet Take 1 Tab by mouth two (2) times a day. No current facility-administered medications for this visit. History reviewed. No pertinent past medical history.      Past Surgical History:   Procedure Laterality Date    HX HEENT  2013,    gum surgery       Family History   Problem Relation Age of Onset    Diabetes Father         Social History     Socioeconomic History    Marital status:      Spouse name: Not on file    Number of children: Not on file    Years of education: Not on file    Highest education level: Not on file Occupational History    Not on file   Tobacco Use    Smoking status: Never Smoker    Smokeless tobacco: Never Used   Substance and Sexual Activity    Alcohol use: No    Drug use: No    Sexual activity: Yes     Partners: Female   Other Topics Concern    Not on file   Social History Narrative    Not on file     Social Determinants of Health     Financial Resource Strain:     Difficulty of Paying Living Expenses: Not on file   Food Insecurity:     Worried About Running Out of Food in the Last Year: Not on file    Buck of Food in the Last Year: Not on file   Transportation Needs:     Lack of Transportation (Medical): Not on file    Lack of Transportation (Non-Medical): Not on file   Physical Activity:     Days of Exercise per Week: Not on file    Minutes of Exercise per Session: Not on file   Stress:     Feeling of Stress : Not on file   Social Connections:     Frequency of Communication with Friends and Family: Not on file    Frequency of Social Gatherings with Friends and Family: Not on file    Attends [employer] Services: Not on file    Active Member of [address] [place] [address] or Organizations: Not on file    Attends Club or Organization Meetings: Not on file    Marital Status: Not on file   Intimate Partner Violence:     Fear of Current or Ex-Partner: Not on file    Emotionally Abused: Not on file    Physically Abused: Not on file    Sexually Abused: Not on file   Housing Stability:     Unable to Pay for Housing in the Last Year: Not on file    Number of [place] in the Last Year: Not on file    Unstable Housing in the Last Year: Not on file       Review of Systems   All other systems reviewed and are negative. Vitals:  Ht 5' 10\" (1.778 m)   Wt 230 lb (104.3 kg)   BMI 33.00 kg/m²    Body mass index is 33 kg/m². Ortho Exam     The patient is well-developed and well-nourished. The patient presents today in alert and oriented x3 with a normal mood and affect.   The patient stands with a normal weightbearing line but walks with a slightly antalgic gait because of his right knee pain.     Right knee, the patient is tender to palpation along the medial joint line, and has an effusion. The patient has no significant discomfort with Pricila´s maneuvers, and the knee is stable. He does have pain with patellar compression and quadriceps contraction with crepitus. With the knee in extension the patella does shift laterally with quadriceps activation. Normal stability is noted on Lachman's exam and varus/valgus stress testing. .  There is a positive J sign. They lack full flexion secondary to the effusion, but have full extension. There is a palpable Baker's cyst.  They have 5/5 strength, and are neurovascularly intact distally. There is no erythema, warmth or skin lesions present. ASSESSMENT/PLAN:      1. Patellofemoral chondrosis of right knee  -     REFERRAL TO PHYSICAL THERAPY  2. Chronic pain of right knee  3. Baker's cyst of knee, right  4. Patellar maltracking, right  5. Patellar subluxation, right, subsequent encounter       Below is the assessment and plan developed based on review of pertinent history, physical exam, labs, studies, and medications. We discussed the patient's ongoing and worsening right knee pain and we independently reviewed his MRI images and results and they were consistent with a large Baker's cyst with partial cyst rupture. Mild lateral patellar subluxation with increased TTTG distance. Foci of patellofemoral and anterior medial femoral condylar chondral derangement. No meniscal, ligamentous, or tendinous derangement is demonstrated.   The possible treatment options were discussed in detail with the patient and because of the duration of his increased pain, no improvement with multiple modalities of conservative management including an at-home exercise program, his physical exam, description of his pain, past x-rays, independently reviewed MRI images and results, and his inability to complete daily living activities without significant discomfort we both decided that surgical intervention was the best treatment plan. The risks and benefits of a right knee arthroscopic exam with chondroplasty and tibial tubercle osteotomy were discussed in detail with the patient and he would like to proceed. We will schedule this at his convenience. The patient does note that he does have an upcoming trip to [facility] plans. We will plan his surgery around this trip. In the interim, I did encourage him to continue to ice and elevate when possible, modify his activity level based on his right knee pain, and use anti-inflammatory medication when necessary. The patient will work on range of motion, strengthening, and stretching exercises at both formal physical therapy and with an at-home exercise program as pain tolerates. He is to avoid any deep knee bend activities against resistance, squatting, kneeling, stairs, lunging, and high impact loading activities. I will see him back in the office on an as-needed basis or on the day of his right knee surgery. Return for In the office as needed or on the day of his right knee surgery. An electronic signature was used to authenticate this note.   -- Aster Gonzalez MD

## 2022-06-01 NOTE — PROGRESS NOTES
Chioma Reina (: 1980) is a 42 y.o. male, patient, here for evaluation of the following chief complaint(s):  Knee Pain (right)       HPI:    He was last seen for his right knee pain on 2022. Prior to that visit, the patient did have an MRI performed on his right knee on 5/10/2022. The patient states that his pain level is better and 6 out of 10. His pain is burning and constant and does keeping weight. He does note swelling. Right knee MRI images and results were independently reviewed and they were consistent with a large Baker's cyst with partial cyst rupture. Mild lateral patellar subluxation with increased TT-TG distance. Foci of patellofemoral and anterior medial femoral condylar chondral derangement. No meniscal, ligamentous, or tendinous derangement is demonstrated. No Known Allergies    Current Outpatient Medications   Medication Sig    cholecalciferol (VITAMIN D3) 1,000 unit tablet Take  by mouth daily.  ergocalciferol (ERGOCALCIFEROL) 50,000 unit capsule TAKE 1 CAP BY MOUTH EVERY SEVEN (7) DAYS    gabapentin (NEURONTIN) 100 mg capsule Take 1 Cap by mouth nightly.  diclofenac EC (VOLTAREN) 75 mg EC tablet Take 1 Tab by mouth two (2) times a day. No current facility-administered medications for this visit. No past medical history on file.      Past Surgical History:   Procedure Laterality Date    HX HEENT  ,    gum surgery       Family History   Problem Relation Age of Onset    Diabetes Father         Social History     Socioeconomic History    Marital status:      Spouse name: Not on file    Number of children: Not on file    Years of education: Not on file    Highest education level: Not on file   Occupational History    Not on file   Tobacco Use    Smoking status: Never Smoker    Smokeless tobacco: Never Used   Substance and Sexual Activity    Alcohol use: No    Drug use: No    Sexual activity: Yes     Partners: Female   Other Topics Concern    Not on file   Social History Narrative    Not on file     Social Determinants of Health     Financial Resource Strain:     Difficulty of Paying Living Expenses: Not on file   Food Insecurity:     Worried About Running Out of Food in the Last Year: Not on file    Buck of Food in the Last Year: Not on file   Transportation Needs:     Lack of Transportation (Medical): Not on file    Lack of Transportation (Non-Medical): Not on file   Physical Activity:     Days of Exercise per Week: Not on file    Minutes of Exercise per Session: Not on file   Stress:     Feeling of Stress : Not on file   Social Connections:     Frequency of Communication with Friends and Family: Not on file    Frequency of Social Gatherings with Friends and Family: Not on file    Attends Congregational Services: Not on file    Active Member of [address] [place] [address] or Organizations: Not on file    Attends Club or Organization Meetings: Not on file    Marital Status: Not on file   Intimate Partner Violence:     Fear of Current or Ex-Partner: Not on file    Emotionally Abused: Not on file    Physically Abused: Not on file    Sexually Abused: Not on file   Housing Stability:     Unable to Pay for Housing in the Last Year: Not on file    Number of [place] in the Last Year: Not on file    Unstable Housing in the Last Year: Not on file       Review of Systems   All other systems reviewed and are negative. Vitals: There were no vitals taken for this visit. There is no height or weight on file to calculate BMI. Ortho Exam     The patient is well-developed and well-nourished.  The patient presents today in alert and oriented x3 with a normal mood and affect.  The patient stands with a normal weightbearing line but walks with a slightly antalgic gait because of his right knee pain.     Right knee, the patient is tender to palpation along the medial joint line, and has an effusion.  The patient has no significant discomfort with Pricila´s maneuvers, and the knee is stable. He does have pain with patellar compression and quadriceps contraction with crepitus. With the knee in extension the patella does shift laterally with quadriceps activation. Normal stability is noted on Lachman's exam and varus/valgus stress testing. .  There is a positive J sign. They lack full flexion secondary to the effusion, but have full extension. Jayden Herbert is a palpable Quiros's cyst.  They have 5/5 strength, and are neurovascularly intact distally. There is no erythema, warmth or skin lesions present. ASSESSMENT/PLAN:      1. Chronic pain of right knee  2. Patellofemoral chondrosis of right knee  -     bupivacaine HCl (MARCAINE) 0.5 % (5 mg/mL) injection 15 mg; 15 mg (3 mL), Other, ONCE, 1 dose, On Mon 6/6/22 at 0800  -     triamcinolone acetonide (KENALOG-40) 40 mg/mL injection 80 mg; 80 mg, Intra artICUlar, ONCE, 1 dose, On Mon 6/6/22 at 0800  3. Baker's cyst of knee, right  4. Patellar maltracking, right  5. Patellar subluxation, right, subsequent encounter       Below is the assessment and plan developed based on review of pertinent history, physical exam, labs, studies, and medications. We discussed the patient's ongoing and worsening right knee pain and we independently reviewed his MRI images and results and they were consistent with a large Baker's cyst with partial cyst rupture. Mild lateral patellar subluxation with increased TT-TG distance. Foci of patellofemoral and anterior medial femoral condylar chondral derangement. No meniscal, ligamentous, or tendinous derangement is demonstrated.   The possible treatment options were discussed in detail with the patient and because of the duration of his increased pain, no improvement with multiple modalities of conservative management including an at-home exercise program, his physical exam, description of his pain, past x-rays, independently reviewed MRI images and results, and his inability to complete daily living activities without significant discomfort we both decided that surgical intervention was the best treatment plan. The possible treatment options were discussed with the patient and because of an effusion present we elected to aspirate and inject his right knee with cortisone today to try and alleviate some of his discomfort. The risks and benefits of the aspiration and injection were discussed in detail with the patient and under sterile prep the patient's right knee was aspirated of approximately 10 ccs of straw-colored synovial fluid and injected with 2 ccs of 40 mg/cc of Kenalog and 3 ccs of 0.5% Sensorcaine. He tolerated both the aspiration and injection well. Also because of the duration of his increased pain we elected to also schedule him for upcoming surgery. The risks and benefits of a right knee arthroscopic exam with chondroplasty and tibial tubercle osteotomy were discussed in detail with the patient and he would like to proceed. We will schedule this at his convenience. The patient does note that he does have an upcoming trip to [facility] plans. We will plan his surgery around this trip. In the interim, I did encourage him to continue to ice and elevate when possible, modify his activity level based on his right knee pain, and use anti-inflammatory medication when necessary. The patient will work on range of motion, strengthening, and stretching exercises at both formal physical therapy and with an at-home exercise program as pain tolerates. He is to avoid any deep knee bend activities against resistance, squatting, kneeling, stairs, lunging, and high impact loading activities. I will see him back in the office on an as-needed basis or on the day of his right knee surgery. Return for In the office as needed or on the day of his upcoming right knee surgery. An electronic signature was used to authenticate this note.   -- Christina Bravo MD

## 2022-06-03 ENCOUNTER — OFFICE VISIT (OUTPATIENT)
Dept: ORTHOPEDIC SURGERY | Age: 42
End: 2022-06-03
Payer: COMMERCIAL

## 2022-06-03 DIAGNOSIS — M22.41 PATELLOFEMORAL CHONDROSIS OF RIGHT KNEE: ICD-10-CM

## 2022-06-03 DIAGNOSIS — S83.001D PATELLAR SUBLUXATION, RIGHT, SUBSEQUENT ENCOUNTER: ICD-10-CM

## 2022-06-03 DIAGNOSIS — M71.21 BAKER'S CYST OF KNEE, RIGHT: ICD-10-CM

## 2022-06-03 DIAGNOSIS — G89.29 CHRONIC PAIN OF RIGHT KNEE: Primary | ICD-10-CM

## 2022-06-03 DIAGNOSIS — M25.561 CHRONIC PAIN OF RIGHT KNEE: Primary | ICD-10-CM

## 2022-06-03 DIAGNOSIS — M22.8X1 PATELLAR MALTRACKING, RIGHT: ICD-10-CM

## 2022-06-06 PROCEDURE — 20610 DRAIN/INJ JOINT/BURSA W/O US: CPT | Performed by: ORTHOPAEDIC SURGERY

## 2022-06-06 RX ORDER — TRIAMCINOLONE ACETONIDE 40 MG/ML
80 INJECTION, SUSPENSION INTRA-ARTICULAR; INTRAMUSCULAR ONCE
Status: COMPLETED | OUTPATIENT
Start: 2022-06-06 | End: 2022-06-06

## 2022-06-06 RX ORDER — BUPIVACAINE HYDROCHLORIDE 5 MG/ML
3 INJECTION, SOLUTION PERINEURAL ONCE
Status: COMPLETED | OUTPATIENT
Start: 2022-06-06 | End: 2022-06-06

## 2022-06-06 RX ADMIN — TRIAMCINOLONE ACETONIDE 80 MG: 40 INJECTION, SUSPENSION INTRA-ARTICULAR; INTRAMUSCULAR at 07:45

## 2022-06-06 RX ADMIN — BUPIVACAINE HYDROCHLORIDE 15 MG: 5 INJECTION, SOLUTION PERINEURAL at 07:44

## 2022-06-23 ENCOUNTER — PATIENT MESSAGE (OUTPATIENT)
Dept: ORTHOPEDIC SURGERY | Age: 42
End: 2022-06-23

## 2023-05-18 RX ORDER — DICLOFENAC SODIUM 75 MG/1
75 TABLET, DELAYED RELEASE ORAL 2 TIMES DAILY
COMMUNITY
Start: 2016-08-16

## 2023-05-18 RX ORDER — GABAPENTIN 100 MG/1
100 CAPSULE ORAL
COMMUNITY
Start: 2017-05-17

## 2023-05-18 RX ORDER — ERGOCALCIFEROL 1.25 MG/1
CAPSULE ORAL
COMMUNITY
Start: 2017-05-23

## 2024-01-04 ENCOUNTER — TELEPHONE (OUTPATIENT)
Dept: PRIMARY CARE CLINIC | Facility: CLINIC | Age: 44
End: 2024-01-04

## 2024-01-04 NOTE — TELEPHONE ENCOUNTER
----- Message from Adina Arias sent at 1/4/2024  1:08 PM EST -----  Subject: Referral Request    Reason for referral request? Fasting labs for physical   Provider patient wants to be referred to(if known):     Provider Phone Number(if known):    Additional Information for Provider? Mr. Esqueda called today to ask if   he can have an order for fasting labs as part of his physical to be   discussed at the time of his appt. (01/26/2024) Please call him if this   request is ok with  Thank you   ---------------------------------------------------------------------------  --------------  CALL BACK INFO    [phone]; OK to leave message on voicemail,OK to respond with electronic   message via Rep portal (only for patients who have registered Rep   account)  ---------------------------------------------------------------------------  --------------

## 2024-01-04 NOTE — TELEPHONE ENCOUNTER
----- Message from Adina Arias sent at 1/4/2024  1:08 PM EST -----  Subject: Referral Request    Reason for referral request? Fasting labs for physical   Provider patient wants to be referred to(if known):     Provider Phone Number(if known):    Additional Information for Provider? Mr. Esqueda called today to ask if   he can have an order for fasting labs as part of his physical to be   discussed at the time of his appt. (01/26/2024) Please call him if this   request is ok with  Thank you   ---------------------------------------------------------------------------  --------------  CALL BACK INFO    [phone]; OK to leave message on voicemail,OK to respond with electronic   message via [employer] portal (only for patients who have registered [employer]   account)  ---------------------------------------------------------------------------  --------------

## 2024-01-26 ENCOUNTER — OFFICE VISIT (OUTPATIENT)
Dept: PRIMARY CARE CLINIC | Facility: CLINIC | Age: 44
End: 2024-01-26
Payer: COMMERCIAL

## 2024-01-26 VITALS
RESPIRATION RATE: 18 BRPM | BODY MASS INDEX: 32.93 KG/M2 | DIASTOLIC BLOOD PRESSURE: 75 MMHG | HEART RATE: 62 BPM | HEIGHT: 70 IN | WEIGHT: 230 LBS | TEMPERATURE: 97.1 F | OXYGEN SATURATION: 100 % | SYSTOLIC BLOOD PRESSURE: 119 MMHG

## 2024-01-26 DIAGNOSIS — Z11.59 NEED FOR HEPATITIS B SCREENING TEST: ICD-10-CM

## 2024-01-26 DIAGNOSIS — E55.9 VITAMIN D DEFICIENCY: ICD-10-CM

## 2024-01-26 DIAGNOSIS — Z23 NEEDS FLU SHOT: ICD-10-CM

## 2024-01-26 DIAGNOSIS — R73.02 IGT (IMPAIRED GLUCOSE TOLERANCE): ICD-10-CM

## 2024-01-26 DIAGNOSIS — R20.2 PARESTHESIA OF BOTH FEET: ICD-10-CM

## 2024-01-26 DIAGNOSIS — Z11.59 NEED FOR HEPATITIS C SCREENING TEST: ICD-10-CM

## 2024-01-26 DIAGNOSIS — Z11.4 ENCOUNTER FOR SCREENING FOR HIV: ICD-10-CM

## 2024-01-26 DIAGNOSIS — Z00.00 PHYSICAL EXAM: Primary | ICD-10-CM

## 2024-01-26 DIAGNOSIS — Z98.890 S/P RIGHT KNEE ARTHROSCOPY: ICD-10-CM

## 2024-01-26 PROCEDURE — 90674 CCIIV4 VAC NO PRSV 0.5 ML IM: CPT | Performed by: INTERNAL MEDICINE

## 2024-01-26 PROCEDURE — 90471 IMMUNIZATION ADMIN: CPT | Performed by: INTERNAL MEDICINE

## 2024-01-26 PROCEDURE — 99396 PREV VISIT EST AGE 40-64: CPT | Performed by: INTERNAL MEDICINE

## 2024-01-26 RX ORDER — MULTIVIT-MIN/IRON/FOLIC ACID/K 18-600-40
CAPSULE ORAL
COMMUNITY

## 2024-01-26 SDOH — ECONOMIC STABILITY: INCOME INSECURITY: HOW HARD IS IT FOR YOU TO PAY FOR THE VERY BASICS LIKE FOOD, HOUSING, MEDICAL CARE, AND HEATING?: NOT HARD AT ALL

## 2024-01-26 SDOH — ECONOMIC STABILITY: HOUSING INSECURITY
IN THE LAST 12 MONTHS, WAS THERE A TIME WHEN YOU DID NOT HAVE A STEADY PLACE TO SLEEP OR SLEPT IN A SHELTER (INCLUDING NOW)?: NO

## 2024-01-26 SDOH — ECONOMIC STABILITY: FOOD INSECURITY: WITHIN THE PAST 12 MONTHS, THE FOOD YOU BOUGHT JUST DIDN'T LAST AND YOU DIDN'T HAVE MONEY TO GET MORE.: NEVER TRUE

## 2024-01-26 SDOH — ECONOMIC STABILITY: FOOD INSECURITY: WITHIN THE PAST 12 MONTHS, YOU WORRIED THAT YOUR FOOD WOULD RUN OUT BEFORE YOU GOT MONEY TO BUY MORE.: NEVER TRUE

## 2024-01-26 ASSESSMENT — ENCOUNTER SYMPTOMS
CHEST TIGHTNESS: 0
COUGH: 0
BACK PAIN: 0
COLOR CHANGE: 0
EYE DISCHARGE: 0
ABDOMINAL PAIN: 0
SORE THROAT: 0
RHINORRHEA: 0
CONSTIPATION: 0
DIARRHEA: 0
SHORTNESS OF BREATH: 0

## 2024-01-26 ASSESSMENT — PATIENT HEALTH QUESTIONNAIRE - PHQ9
SUM OF ALL RESPONSES TO PHQ QUESTIONS 1-9: 0
SUM OF ALL RESPONSES TO PHQ9 QUESTIONS 1 & 2: 0
SUM OF ALL RESPONSES TO PHQ QUESTIONS 1-9: 0
2. FEELING DOWN, DEPRESSED OR HOPELESS: 0
SUM OF ALL RESPONSES TO PHQ QUESTIONS 1-9: 0
SUM OF ALL RESPONSES TO PHQ QUESTIONS 1-9: 0
1. LITTLE INTEREST OR PLEASURE IN DOING THINGS: 0

## 2024-01-26 NOTE — PROGRESS NOTES
\"Have you been to the ER, urgent care clinic since your last visit?  Hospitalized since your last visit?\"    NO        “Have you seen or consulted any other health care providers outside of [facility] since your last visit?”    Ortho for knee surgery. PT        Chief Complaint   Patient presents with    Annual Exam       Pt is ok with scribe.         Patient provided with most updated VIS prior to administration. Opportunity given for questions and concerns provided. No concerns at this time    Flu vaccine given in left deltoid. Immunizations administered to patient 1/26/2024 by Lulú Zimmer LPN with consent.Patient tolerated procedure well. No reactions noted.

## 2024-01-26 NOTE — PROGRESS NOTES
Greyson Esqueda (:  1980) is a 43 y.o. male, Established patient, here for evaluation of the following chief complaint(s):  Annual Exam         ASSESSMENT/PLAN:  1. Physical exam  -     CBC; Future  -     Comprehensive Metabolic Panel; Future  -     Lipid Panel; Future  Complete physical done today. Routine lab work ordered. Waiting for results.     2. S/P right knee arthroscopy  He is followed by Orthopedics.  He had his baker's cyst cleaned up via an arthroplasty procedure.    3. Needs flu shot  -     Influenza, FLUCELVAX, (age 6 mo+), IM, Preservative Free, 0.5 mL  A flu shot was administered in office.    4. Need for hepatitis C screening test  -     Hepatitis C Antibody; Future  Ordered Hepatitis C test. Waiting for results.    5. Need for hepatitis B screening test  -     Hepatitis B Surface Antibody; Future  Ordered Hepatitis B test. Waiting for results.    6. IGT (impaired glucose tolerance)  -     Hemoglobin A1C; Future  I ordered blood work to check his Hgb A1c.    7. Vitamin D deficiency  -     Vitamin D 25 Hydroxy; Future  Ordered lab work to check Vitamin D levels. Waiting for results. Recommend that patient take a Vitamin D supplement of 1,000 units daily.     8. Encounter for screening for HIV  -     HIV 1/2 Ag/Ab, 4TH Generation,W Rflx Confirm; Future  I ordered blood work to check for HIV.    9. Paresthesia of both feet  -     Vitamin B12; Future  Ordered lab work to check Vitamin B12 levels. Waiting for results. Recommend that patient take a Vitamin B12 supplement daily.          Subjective   SUBJECTIVE/OBJECTIVE:  HPI    Patient presents today for an office visit and a physical.  He is not fasting today.    He notes that he is a vegetarian but denies fatigue.  He states that he has a history of low vitamin D.  He has lost 10 lb in the past 4 weeks.  He walks regularly outside or on the elliptical for 20 minutes at a time.    He is followed by Orthopedics for right knee issues.  He had

## 2024-01-29 DIAGNOSIS — R73.02 IGT (IMPAIRED GLUCOSE TOLERANCE): ICD-10-CM

## 2024-01-29 DIAGNOSIS — Z11.59 NEED FOR HEPATITIS C SCREENING TEST: ICD-10-CM

## 2024-01-29 DIAGNOSIS — R20.2 PARESTHESIA OF BOTH FEET: ICD-10-CM

## 2024-01-29 DIAGNOSIS — Z00.00 PHYSICAL EXAM: ICD-10-CM

## 2024-01-29 DIAGNOSIS — Z11.59 NEED FOR HEPATITIS B SCREENING TEST: ICD-10-CM

## 2024-01-29 DIAGNOSIS — Z11.4 ENCOUNTER FOR SCREENING FOR HIV: ICD-10-CM

## 2024-01-29 DIAGNOSIS — E55.9 VITAMIN D DEFICIENCY: ICD-10-CM

## 2024-01-29 LAB
ALBUMIN SERPL-MCNC: 4.1 G/DL (ref 3.5–5)
ALBUMIN/GLOB SERPL: 1.2 (ref 1.1–2.2)
ALP SERPL-CCNC: 80 U/L (ref 45–117)
ALT SERPL-CCNC: 20 U/L (ref 12–78)
ANION GAP SERPL CALC-SCNC: 4 MMOL/L (ref 5–15)
AST SERPL-CCNC: 15 U/L (ref 15–37)
BILIRUB SERPL-MCNC: 0.5 MG/DL (ref 0.2–1)
BUN SERPL-MCNC: 12 MG/DL (ref 6–20)
BUN/CREAT SERPL: 13 (ref 12–20)
CALCIUM SERPL-MCNC: 9.2 MG/DL (ref 8.5–10.1)
CHLORIDE SERPL-SCNC: 105 MMOL/L (ref 97–108)
CHOLEST SERPL-MCNC: 132 MG/DL
CO2 SERPL-SCNC: 30 MMOL/L (ref 21–32)
CREAT SERPL-MCNC: 0.9 MG/DL (ref 0.7–1.3)
ERYTHROCYTE [DISTWIDTH] IN BLOOD BY AUTOMATED COUNT: 13 % (ref 11.5–14.5)
EST. AVERAGE GLUCOSE BLD GHB EST-MCNC: 105 MG/DL
GLOBULIN SER CALC-MCNC: 3.5 G/DL (ref 2–4)
GLUCOSE SERPL-MCNC: 104 MG/DL (ref 65–100)
HBA1C MFR BLD: 5.3 % (ref 4–5.6)
HCT VFR BLD AUTO: 43.2 % (ref 36.6–50.3)
HDLC SERPL-MCNC: 41 MG/DL
HDLC SERPL: 3.2 (ref 0–5)
HGB BLD-MCNC: 14 G/DL (ref 12.1–17)
LDLC SERPL CALC-MCNC: 75.2 MG/DL (ref 0–100)
MCH RBC QN AUTO: 27.6 PG (ref 26–34)
MCHC RBC AUTO-ENTMCNC: 32.4 G/DL (ref 30–36.5)
MCV RBC AUTO: 85 FL (ref 80–99)
NRBC # BLD: 0 K/UL (ref 0–0.01)
NRBC BLD-RTO: 0 PER 100 WBC
PLATELET # BLD AUTO: 269 K/UL (ref 150–400)
PMV BLD AUTO: 10.6 FL (ref 8.9–12.9)
POTASSIUM SERPL-SCNC: 4.4 MMOL/L (ref 3.5–5.1)
PROT SERPL-MCNC: 7.6 G/DL (ref 6.4–8.2)
RBC # BLD AUTO: 5.08 M/UL (ref 4.1–5.7)
SODIUM SERPL-SCNC: 139 MMOL/L (ref 136–145)
TRIGL SERPL-MCNC: 79 MG/DL
VIT B12 SERPL-MCNC: 199 PG/ML (ref 193–986)
VLDLC SERPL CALC-MCNC: 15.8 MG/DL
WBC # BLD AUTO: 5.9 K/UL (ref 4.1–11.1)

## 2024-01-30 LAB
25(OH)D3 SERPL-MCNC: 23.4 NG/ML (ref 30–100)
HBV SURFACE AB SER QL: NONREACTIVE
HBV SURFACE AB SER-ACNC: <3.1 MIU/ML
HCV AB SER IA-ACNC: 0.18 INDEX
HCV AB SERPL QL IA: NONREACTIVE
HIV 1+2 AB+HIV1 P24 AG SERPL QL IA: NONREACTIVE
HIV 1/2 RESULT COMMENT: NORMAL